# Patient Record
(demographics unavailable — no encounter records)

---

## 2024-10-18 NOTE — REASON FOR VISIT
[FreeTextEntry1] : Cardiology consultation for evaluation management of chest pain and to establish cardiology care.

## 2024-10-18 NOTE — END OF VISIT
[Time Spent: ___ minutes] : I have spent [unfilled] minutes of time on the encounter which excludes teaching and separately reported services.
Angela Saba (mom)

## 2024-10-18 NOTE — PHYSICAL EXAM
[Normal S1, S2] : normal S1, S2 [No Rub] : no rub [No Gallop] : no gallop [Murmur] : murmur [Normal] : alert and oriented, normal memory [de-identified] : l/Vl systolic

## 2024-10-18 NOTE — DISCUSSION/SUMMARY
[FreeTextEntry1] : 58-year-old woman presents as a self-referral for cardiology evaluation due to concern for 3 episodes of chest pain, as described above. She describes prior cardiac history of what appears to be stress-induced cardiomyopathy, possible Takotsubo syndrome in the setting of getting IV epinephrine post radiology study in 2010.  Reportedly she had recovery of LV function and status post angiogram x 2 that were negative. Medical issues include prior history of obesity, status post remote gastric bypass surgery, multiple abdominal surgeries for diverticulitis and stomach ulcer. On physical examination, blood pressure is low normal.  She is euvolemic.  1/6 systolic murmur. EKG sinus rhythm, within normal limits. Rule out ischemic heart disease.  Rule out structural heart disease. The following is recommended.  Plan 1.  Exercise stress test to evaluate functional capacity and evaluate for stress-induced coronary insufficiency. 2.  Echocardiogram to evaluate for possible underlying structural heart disease. 3.  Review records from prior cardiologist, she has signed a medical release form.  Also review recent blood work done at primary care provider's office, she signed a medical release form. 4.  Further cardiac recommendations pending above workup. 5.  Cardiac issues were discussed with her in detail and all of her questions have been answered to her satisfaction. [EKG obtained to assist in diagnosis and management of assessed problem(s)] : EKG obtained to assist in diagnosis and management of assessed problem(s)

## 2024-10-18 NOTE — HISTORY OF PRESENT ILLNESS
[FreeTextEntry1] : The patient is a 58-year-old woman who presents as a self-referral for cardiology evaluation due to concern for chest pain and to establish cardiology care. She endorses 3 times over the past 2 months, she has had chest pain.  Symptoms of intense abdominal pain radiating to her back and then up to her chest and into her jaw.  The first time this occurred was at rest and the 2 other times while she was doing a slow walk.  The episodes lasted about 15 minutes and resolved on their own. There was no associated diaphoresis or shortness of breath.  No palpitations.  No syncope or near syncope.  No edema, no orthopnea.  No PND. She reports having had similar symptoms when she had a "heart attack" in 2010 (see below).  Past history: She describes a cardiac event in 2010. She was getting an outpatient CAT scan with contrast and was given an IV injection, later she found that it was high dose of epinephrine. Soon afterwards, she felt very unwell, had abdominal and chest pain, the staff at the radiology center came to her and "crash cart" was brought in.  EMS was activated.  She has vague memory of exactly what happened at that time.  Transported to a local hospital.  Given multiple IV medications, she does not know details. Spent 10 days in the hospital.  Endorses that she had a "very low heart" ejection fraction 25%.  Clinically she improved and was discharged home. She had follow-up cardiology visits and was then told that her LV function had recovered. Status post angiogram x 2 with normal coronary arteries. She has been following with a cardiologist in [place], Dr. Michael Diaz, last seen about 2 years ago. History of morbid obesity, status post gastric bypass in the remote past.  She has had surgeries for diverticulitis and a stomach ulcer. Hypothyroidism. Chronically low blood pressure. Current medications: acarbose, Lexapro, clonazepam, levothyroxine and pantoprazole. Allergies: None. Social history: Non-smoker.  No alcohol.  She lives with a domestic partner.  2 adult children.  Works part-time as a  at [employer]. Family history: Negative for heart disease.

## 2024-10-18 NOTE — REVIEW OF SYSTEMS
[Feeling Fatigued] : feeling fatigued [Chest Discomfort] : chest discomfort [Abdominal Pain] : abdominal pain [Negative] : Psychiatric

## 2025-01-22 NOTE — DATA REVIEWED
[FreeTextEntry1] : Paul 1/22/2025: Overnight hypoglycemia at times also more commonly following a spike in glucose during the day and after dinner.   Labs 9/2024: CBC normal A1c 5.1% TG 86 Chol 192 HDL 59  CMP normal except AST of 60 and ALKP 130 Insulin 31.4 C-peptide 10.3 TSH 3.06 Free T4 0.7 TPO Ab neg.   Labs 9/2023: Insulin Ab neg.  Proinsulin 4.9 Insulin 7.4 C-peptide 3.0 Glucose 58 CMP otherwise normal except AST 60 Cortisol 11.6 BHB 0.1  Paul 9/27/2023: Overnight hypoglycemia at times also more commonly following a spike in glucose during the day and after dinner.   Labs 5/17/2023: Glucose 90  Labs 4/28/2023: CBC normal CMP normal Chol 160 HDL 57 TG 52 LDL 93 A1c 5.3% Vit D 32.9 TSH 2.030 T4 4.9 Ca 9.4 PTH 64

## 2025-01-22 NOTE — PHYSICAL EXAM
[Alert] : alert [No Acute Distress] : no acute distress [EOMI] : extra ocular movement intact [Normal Hearing] : hearing was normal [Supple] : the neck was supple [Thyroid Not Enlarged] : the thyroid was not enlarged [No Respiratory Distress] : no respiratory distress [No Accessory Muscle Use] : no accessory muscle use [Normal Rate and Effort] : normal respiratory rate and effort [Clear to Auscultation] : lungs were clear to auscultation bilaterally [Normal S1, S2] : normal S1 and S2 [Normal Rate] : heart rate was normal [Regular Rhythm] : with a regular rhythm [No Edema] : no peripheral edema [Normal Bowel Sounds] : normal bowel sounds [Not Tender] : non-tender [Not Distended] : not distended [Soft] : abdomen soft [No Stigmata of Cushings Syndrome] : no stigmata of Cushings Syndrome [No Clubbing, Cyanosis] : no clubbing  or cyanosis of the fingernails [No Involuntary Movements] : no involuntary movements were seen [Normal Strength/Tone] : muscle strength and tone were normal [Oriented x3] : oriented to person, place, and time [Normal Affect] : the affect was normal [Normal Mood] : the mood was normal

## 2025-01-22 NOTE — ASSESSMENT
[FreeTextEntry1] : 59 y/o F w/  1. Hypoglycemia- Pt. with hx of gastric bypass, symptoms of hypoglycemia, documented hypoglycemia on her glucometer, and improvement in symptoms with glucose. Suspicion was high for true hypoglycemia confirmed 9/2023 with inappropriately normal insulin level and c-peptide with glucose of 58. 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 Did not tolerate acarbose TID due to GI intolerance but given that most of her symptoms are overnight, can try taking only with dinner especially since she will be home and not outside the house if she has to use the restroom. Ozempic started with improvement in symptoms and hypoglycemia. Can continue with semaglutide through compounding pharmacy now no longer covered by insurance. Also advised about dietary and lifestyle modifications such as avoiding simple sugars and adding more protein to prevent the large spike in glucose and influx of insulin as a result. Continue to monitor with Paul CGM to gain a better sense of hypoglycemia and patterns. Would consider adding diazoxide if no improvement with semaglutide. Pt. will need modifications at work given risk of hypoglycemia and need to be alert at all times.   2. Hypothyroidism- chemically euthyroid. Can repeat TFTs and consider dose adjustment at that time. Pt. clinically euthyroid on exam.   3. MNG- Thyroid US ordered.

## 2025-01-22 NOTE — HISTORY OF PRESENT ILLNESS
[FreeTextEntry1] : 59 y/o F w/ hx of hypothyroidism dx approximately 2016. She was on [place] Thyroid but because of palpitations was switched to levothyroxine 25 mcg once a day and eventually increased to 2 tablets daily.  Now on Synthroid 75 mcg daily. Takes daily in the morning on empty stomach. Thyroid antibodies were negative. +chronic fatigue and cold intolerance. +weight loss s/p surgery. Initial weight 257  +hx of steroid use for angioedema Mother with hypothyroidism and Father with thyroid disease s/p immunotherapy. No radiation or surgery to the head or neck Never on lithium or amiodarone.  She has had a bone density in 2023 with osteopenia. Lowest site -2.2 right total hip.    Had noticed overnight with symptoms of hypoglycemia. Glucose 50's-60's on Paul with alarms. Also with hx of glucose 30's-50's on glucometer. Hx of gastric bypass. Had been getting worse over the years. Unclear if worse with certain foods. Drinks protein shakes. Eats yogurts. Snacks with pretzels and carbs. Brings candy up to bedroom to treat hypoglycemia. Tried acarbose but unable to tolerate due to GI symptoms. Recommended trial of Ozempic to decrease cravings and risk of hypoglycemia which she reports helped greatly at 0.25mg weekly dose. Insurance was no longer covering it, now sending to compounding pharmacy.   Hx of thyroid nodule with repeat thyroid US without evidence of any nodules.

## 2025-01-22 NOTE — REVIEW OF SYSTEMS
[Fatigue] : fatigue [Diarrhea] : diarrhea [Joint Pain] : joint pain [All other systems negative] : All other systems negative [Recent Weight Gain (___ Lbs)] : no recent weight gain [Recent Weight Loss (___ Lbs)] : no recent weight loss [Visual Field Defect] : no visual field defect [Dysphagia] : no dysphagia [Dysphonia] : no dysphonia [Chest Pain] : no chest pain [Palpitations] : no palpitations [Shortness Of Breath] : no shortness of breath [Nausea] : no nausea [Vomiting] : no vomiting [Polyuria] : no polyuria [Headaches] : no headaches [Tremors] : no tremors [Polydipsia] : no polydipsia [FreeTextEntry7] : with dumping